# Patient Record
Sex: MALE | Race: WHITE | NOT HISPANIC OR LATINO | Employment: FULL TIME | URBAN - METROPOLITAN AREA
[De-identification: names, ages, dates, MRNs, and addresses within clinical notes are randomized per-mention and may not be internally consistent; named-entity substitution may affect disease eponyms.]

---

## 2021-02-26 ENCOUNTER — OFFICE VISIT (OUTPATIENT)
Dept: FAMILY MEDICINE CLINIC | Facility: CLINIC | Age: 61
End: 2021-02-26
Payer: COMMERCIAL

## 2021-02-26 VITALS
DIASTOLIC BLOOD PRESSURE: 82 MMHG | WEIGHT: 149.8 LBS | HEIGHT: 70 IN | BODY MASS INDEX: 21.45 KG/M2 | SYSTOLIC BLOOD PRESSURE: 122 MMHG | HEART RATE: 76 BPM

## 2021-02-26 DIAGNOSIS — N52.9 ERECTILE DYSFUNCTION, UNSPECIFIED ERECTILE DYSFUNCTION TYPE: ICD-10-CM

## 2021-02-26 DIAGNOSIS — Z86.16 HISTORY OF COVID-19: ICD-10-CM

## 2021-02-26 DIAGNOSIS — K40.90 LEFT INGUINAL HERNIA: Primary | ICD-10-CM

## 2021-02-26 DIAGNOSIS — F41.9 ANXIETY: ICD-10-CM

## 2021-02-26 PROCEDURE — 99204 OFFICE O/P NEW MOD 45 MIN: CPT | Performed by: PHYSICIAN ASSISTANT

## 2021-02-26 PROCEDURE — 3725F SCREEN DEPRESSION PERFORMED: CPT | Performed by: PHYSICIAN ASSISTANT

## 2021-02-26 RX ORDER — LORAZEPAM 0.5 MG/1
TABLET ORAL
Qty: 2 TABLET | Refills: 0 | Status: SHIPPED | OUTPATIENT
Start: 2021-02-26

## 2021-02-26 NOTE — PATIENT INSTRUCTIONS
Assessment/plan:  1  Left inguinal hernia-patient should consult with general surgeon for evaluation and consider treatment options  There is no sign of strangulation or need for emergent surgery  2  Erectile dysfunction-new onset  I do not feel it is likely related to his hernia, but more likely prostate or blood flow  He does seem to be getting some benefit since he has been using saw palmetto and he prefers not to use other medications at this point so we will continue observation and he will continue his herbal supplement  I would offer something like Cialis in the future which has dual benefit of prostate health and erectile function if necessary  3  Family history of cancer-patient does have significant family history of multiple cancers  Would recommend screening labs be performed  4  Anxiety-patient has significant fear of needles and blood work  He has always passed out in the past   I will give him for a prescription for Ativan 0 5 mg to be taken 1-1/2 hours prior to procedure  His wife will drive him in take him home from procedure and he may not drive for 8 hours after taking the medication  Patient verbalizes understanding and agreement with plan

## 2021-02-26 NOTE — PROGRESS NOTES
Assessment and Plan:  Patient Instructions   Assessment/plan:  1  Left inguinal hernia-patient should consult with general surgeon for evaluation and consider treatment options  There is no sign of strangulation or need for emergent surgery  2  Erectile dysfunction-new onset  I do not feel it is likely related to his hernia, but more likely prostate or blood flow  He does seem to be getting some benefit since he has been using saw palmetto and he prefers not to use other medications at this point so we will continue observation and he will continue his herbal supplement  I would offer something like Cialis in the future which has dual benefit of prostate health and erectile function if necessary  3  Family history of cancer-patient does have significant family history of multiple cancers  Would recommend screening labs be performed  4  Anxiety-patient has significant fear of needles and blood work  He has always passed out in the past   I will give him for a prescription for Ativan 0 5 mg to be taken 1-1/2 hours prior to procedure  His wife will drive him in take him home from procedure and he may not drive for 8 hours after taking the medication  Patient verbalizes understanding and agreement with plan          Problem List Items Addressed This Visit     None      Visit Diagnoses     Left inguinal hernia    -  Primary    Relevant Orders    Ambulatory referral to General Surgery    Erectile dysfunction, unspecified erectile dysfunction type        Relevant Orders    CBC and differential    Comprehensive metabolic panel    Lipid Panel with Direct LDL reflex    PSA, Total Screen    TSH, 3rd generation with Free T4 reflex    Anxiety        Relevant Medications    LORazepam (ATIVAN) 0 5 mg tablet    History of COVID-19        Relevant Orders    CBC and differential    Comprehensive metabolic panel    Lipid Panel with Direct LDL reflex    PSA, Total Screen    TSH, 3rd generation with Free T4 reflex Diagnoses and all orders for this visit:    Left inguinal hernia  -     Ambulatory referral to General Surgery; Future    Erectile dysfunction, unspecified erectile dysfunction type  -     CBC and differential  -     Comprehensive metabolic panel  -     Lipid Panel with Direct LDL reflex  -     PSA, Total Screen  -     TSH, 3rd generation with Free T4 reflex    Anxiety  -     LORazepam (ATIVAN) 0 5 mg tablet; Take 1 tablet about 1-1/2 hours prior to procedure  History of COVID-19  -     CBC and differential  -     Comprehensive metabolic panel  -     Lipid Panel with Direct LDL reflex  -     PSA, Total Screen  -     TSH, 3rd generation with Free T4 reflex    Other orders  -     patient supplied medication; Urinozinc/ Prostate vitamin   -     Cancel: Echo complete with contrast if indicated; Future              Subjective:      Patient ID: Annie Robles is a 61 y o  male  CC:    Chief Complaint   Patient presents with   [address] [place] [address] Patient Visit     Pt here after many years to get Routine check up  Pt also states he had confirmed COVID in 11/2020 but thinks he may have had COVID in January 2020  He has many after effects from having it   Erectile Dysfunction     Pt states since COVID he is having issues with ED   Edema     Pt has some swelling on the left side of groin and it was many years ago that this happened and resolved but over the last 4 months this has come back  kw    Fainting     Pt just wanted to let us know that he has a fear of needles, doctors, etc and has a tendency to faint during procedures  HPI:    HPI:  This is a 80-year-old gentleman that presents to the office after not being seen for about 15 years  He has been feeling well and trying to stay healthy on his own  He believes primarily in less medicine and prefers herbal remedies or natural things when possible  He did have COVID infection confirmed back in November    Since then he has had some altered sense of taste, most specifically with regard to his previous soda and caffeine addiction  He was drinking about 10 cans of Dr Kendra Suazo soda per day and coffee and he seemed to lose his taste for both  He has now been drinking more water and juice since  Otherwise he has noticed in the past 3 weeks that he had some new onset erectile difficulties  More specifically he was having difficulty achieving erection to begin with and was not having morning erections  He thought this may be related to prostate health and started taking some saw palmetto supplement over-the-counter which was of some help  He does feel that his symptoms are improving since taking it  He also had a history of a lump in the inguinal area and states it had gotten better for years but now it has returned  He is not sure if it is coincidence or if this has something to do with erectile function as well  It has been decades since he has had any testing or screening done since he does fear doctors, testing, and needles  He has always passed out with blood work in the past       The following portions of the patient's history were reviewed and updated as appropriate: allergies, current medications, past family history, past medical history, past social history, past surgical history and problem list       Review of Systems   Constitutional: Negative for chills, fatigue and fever  HENT: Negative for congestion, ear pain and sinus pressure  Eyes: Negative for visual disturbance  Respiratory: Negative for cough, chest tightness and shortness of breath  Cardiovascular: Negative for chest pain and palpitations  Gastrointestinal: Negative for diarrhea, nausea and vomiting  Endocrine: Negative for polyuria  Genitourinary: Negative for dysuria and frequency  Bulge of the left inguinal area  Musculoskeletal: Negative for arthralgias and myalgias  Skin: Negative for pallor and rash     Neurological: Negative for dizziness, weakness, light-headedness, numbness and headaches  Psychiatric/Behavioral: Negative for agitation, behavioral problems and sleep disturbance  All other systems reviewed and are negative  Data to review:       Objective:    Vitals:    02/26/21 0911   BP: 122/82   BP Location: Left arm   Patient Position: Sitting   Pulse: 76   Weight: 67 9 kg (149 lb 12 8 oz)   Height: 5' 9 5" (1 765 m)        Physical Exam  Constitutional:       General: He is not in acute distress  Appearance: He is well-developed  HENT:      Head: Normocephalic and atraumatic  Right Ear: Tympanic membrane normal       Left Ear: Tympanic membrane normal    Eyes:      Conjunctiva/sclera: Conjunctivae normal    Neck:      Musculoskeletal: Normal range of motion  Cardiovascular:      Rate and Rhythm: Normal rate and regular rhythm  Pulmonary:      Effort: Pulmonary effort is normal    Abdominal:      General: Abdomen is flat  Bowel sounds are normal  There is no distension  Palpations: Abdomen is soft  There is no mass  Genitourinary:     Comments: There does appear to be direct to left inguinal hernia present on exam   This is not reducible but not strangulated  Musculoskeletal: Normal range of motion  Skin:     General: Skin is warm  Findings: No rash  Neurological:      Mental Status: He is alert and oriented to person, place, and time  Psychiatric:         Mood and Affect: Mood normal                BMI Counseling: Body mass index is 21 8 kg/m²  The BMI is above normal  Nutrition recommendations include reducing portion sizes

## 2021-03-17 ENCOUNTER — CONSULT (OUTPATIENT)
Dept: SURGERY | Facility: CLINIC | Age: 61
End: 2021-03-17
Payer: COMMERCIAL

## 2021-03-17 VITALS
RESPIRATION RATE: 16 BRPM | SYSTOLIC BLOOD PRESSURE: 138 MMHG | DIASTOLIC BLOOD PRESSURE: 84 MMHG | HEART RATE: 73 BPM | WEIGHT: 148 LBS | TEMPERATURE: 97.4 F | BODY MASS INDEX: 21.19 KG/M2 | HEIGHT: 70 IN

## 2021-03-17 DIAGNOSIS — K40.20 NON-RECURRENT BILATERAL INGUINAL HERNIA WITHOUT OBSTRUCTION OR GANGRENE: ICD-10-CM

## 2021-03-17 DIAGNOSIS — Z12.11 ENCOUNTER FOR SCREENING COLONOSCOPY: Primary | ICD-10-CM

## 2021-03-17 PROBLEM — K40.90 LEFT INGUINAL HERNIA: Status: RESOLVED | Noted: 2021-03-17 | Resolved: 2021-03-17

## 2021-03-17 PROBLEM — K40.90 LEFT INGUINAL HERNIA: Status: ACTIVE | Noted: 2021-03-17

## 2021-03-17 PROCEDURE — 1036F TOBACCO NON-USER: CPT | Performed by: SURGERY

## 2021-03-17 PROCEDURE — 99213 OFFICE O/P EST LOW 20 MIN: CPT | Performed by: SURGERY

## 2021-03-17 PROCEDURE — 3008F BODY MASS INDEX DOCD: CPT | Performed by: SURGERY

## 2021-03-17 NOTE — ASSESSMENT & PLAN NOTE
He does have evidence of reducible bilateral inguinal hernias  They are on the small side and mostly fat containing  I discussed with him that I recommend consideration for repair and that explained I can fix them both the same time  I recommend a laparoscopic robotic approach  However this time he is very anxious about any medical procedures and is not interested in pursuing repair at this time  I went through with him the symptoms and signs of incarceration strangulation and that if things change or increase in size he should call back for evaluation

## 2021-03-17 NOTE — ASSESSMENT & PLAN NOTE
Also discussed with him the need for a colonoscopy  He has not had 1 and he is 10 years overdue  I explained to him the benefits to screening colonoscopy and procedure and what it would entail  But again he is very anxious about all medical procedures is not interested at this time  Told that if he changes mind after he discusses this with his wife he should return for evaluation

## 2021-03-17 NOTE — PROGRESS NOTES
Assessment/Plan:    Non-recurrent bilateral inguinal hernia without obstruction or gangrene    He does have evidence of reducible bilateral inguinal hernias  They are on the small side and mostly fat containing  I discussed with him that I recommend consideration for repair and that explained I can fix them both the same time  I recommend a laparoscopic robotic approach  However this time he is very anxious about any medical procedures and is not interested in pursuing repair at this time  I went through with him the symptoms and signs of incarceration strangulation and that if things change or increase in size he should call back for evaluation  Encounter for screening colonoscopy    Also discussed with him the need for a colonoscopy  He has not had 1 and he is 10 years overdue  I explained to him the benefits to screening colonoscopy and procedure and what it would entail  But again he is very anxious about all medical procedures is not interested at this time  Told that if he changes mind after he discusses this with his wife he should return for evaluation  Diagnoses and all orders for this visit:    Encounter for screening colonoscopy    Non-recurrent bilateral inguinal hernia without obstruction or gangrene  -     Ambulatory referral to General Surgery          Subjective:      Patient ID: Gila Shah is a 61 y o  male  MrStew  Is a 79-year-old gentleman is here for evaluation of a possible left inguinal hernia  Patient states he believes around 15 years ago he had a lump in that area that was maybe hernia  He felt that got better and has not noticed it for a long time  Over last few months we started noticing increasing swelling left side again  He was COVID positive in November and maybe the coughing increased size  He does notice a lump in the left side  He denies pain  Denies nausea vomiting  in addition he has never had a colonoscopy    He states his bowel have my of change recently  He denies any blood in the stool or any unexplained weight loss  He is very anxious about all medical procedures and is very hesitant to follow-up with physicians  The following portions of the patient's history were reviewed and updated as appropriate: allergies, current medications, past family history, past medical history, past social history, past surgical history and problem list     Review of Systems   Constitutional: Negative for chills and fever  HENT: Negative for ear pain and sore throat  Eyes: Negative for pain and visual disturbance  Respiratory: Negative for cough and shortness of breath  Cardiovascular: Negative for chest pain and palpitations  Gastrointestinal: Negative for abdominal pain and vomiting  Musculoskeletal: Negative for arthralgias and back pain  Skin: Negative for color change and rash  Neurological: Negative for seizures and syncope  Psychiatric/Behavioral: Negative for agitation  The patient is nervous/anxious  All other systems reviewed and are negative  Objective:      /84   Pulse 73   Temp (!) 97 4 °F (36 3 °C)   Resp 16   Ht 5' 9 5" (1 765 m)   Wt 67 1 kg (148 lb)   BMI 21 54 kg/m²          Physical Exam  Vitals signs and nursing note reviewed  Exam conducted with a chaperone present  Constitutional:       Appearance: Normal appearance  HENT:      Head: Normocephalic and atraumatic  Cardiovascular:      Rate and Rhythm: Normal rate and regular rhythm  Pulmonary:      Effort: Pulmonary effort is normal       Breath sounds: Normal breath sounds  Abdominal:      General: There is no distension  Palpations: Abdomen is soft  There is no mass  Tenderness: There is no abdominal tenderness  Comments:   Bilateral reducible inguinal hernias left greater than right  Musculoskeletal: Normal range of motion  Skin:     General: Skin is warm and dry  Neurological:      Mental Status: He is alert  Psychiatric:         Mood and Affect: Mood normal          Behavior: Behavior normal

## 2024-02-27 ENCOUNTER — OFFICE VISIT (OUTPATIENT)
Dept: SURGERY | Facility: CLINIC | Age: 64
End: 2024-02-27
Payer: COMMERCIAL

## 2024-02-27 VITALS
HEIGHT: 71 IN | BODY MASS INDEX: 21.14 KG/M2 | RESPIRATION RATE: 18 BRPM | WEIGHT: 151 LBS | OXYGEN SATURATION: 97 % | DIASTOLIC BLOOD PRESSURE: 80 MMHG | HEART RATE: 71 BPM | TEMPERATURE: 98 F | SYSTOLIC BLOOD PRESSURE: 131 MMHG

## 2024-02-27 DIAGNOSIS — K40.20 NON-RECURRENT BILATERAL INGUINAL HERNIA WITHOUT OBSTRUCTION OR GANGRENE: Primary | ICD-10-CM

## 2024-02-27 DIAGNOSIS — N40.0 PROSTATE ENLARGEMENT: ICD-10-CM

## 2024-02-27 DIAGNOSIS — Z12.11 ENCOUNTER FOR SCREENING COLONOSCOPY: ICD-10-CM

## 2024-02-27 PROCEDURE — 99203 OFFICE O/P NEW LOW 30 MIN: CPT | Performed by: SURGERY

## 2024-02-27 RX ORDER — SODIUM CHLORIDE, SODIUM LACTATE, POTASSIUM CHLORIDE, CALCIUM CHLORIDE 600; 310; 30; 20 MG/100ML; MG/100ML; MG/100ML; MG/100ML
125 INJECTION, SOLUTION INTRAVENOUS CONTINUOUS
OUTPATIENT
Start: 2024-04-25

## 2024-02-27 NOTE — ASSESSMENT & PLAN NOTE
Again reviewed the need for a screening colonoscopy.  He is more open to follow-up with this would like to have his hernias fixed and his evaluation by urology first.

## 2024-02-27 NOTE — ASSESSMENT & PLAN NOTE
He has bilateral inguinal hernias on exam.  Will plan for robotic repair of his bilateral inguinal hernias with mesh at [facility].  The risks benefits alternatives explained to him is agreeable to proceed.

## 2024-02-27 NOTE — ASSESSMENT & PLAN NOTE
He is complaining of some urinary issues and also some intermittent left testicular pain.  I will refer him to urology per patient request for evaluation.

## 2024-02-27 NOTE — PROGRESS NOTES
Assessment/Plan:    Non-recurrent bilateral inguinal hernia without obstruction or gangrene  He has bilateral inguinal hernias on exam.  Will plan for robotic repair of his bilateral inguinal hernias with mesh at [facility].  The risks benefits alternatives explained to him is agreeable to proceed.    Encounter for screening colonoscopy  Again reviewed the need for a screening colonoscopy.  He is more open to follow-up with this would like to have his hernias fixed and his evaluation by urology first.    Prostate enlargement  He is complaining of some urinary issues and also some intermittent left testicular pain.  I will refer him to urology per patient request for evaluation.       Diagnoses and all orders for this visit:    Non-recurrent bilateral inguinal hernia without obstruction or gangrene  -     Case request operating room: REPAIR HERNIA INGUINAL LAPAROSCOPIC W/ ROBOTICS; Standing  -     EKG 12 lead; Future  -     Case request operating room: REPAIR HERNIA INGUINAL LAPAROSCOPIC W/ ROBOTICS    Prostate enlargement  -     Ambulatory Referral to Urology; Future    Encounter for screening colonoscopy    Other orders  -     Diet NPO; Sips with meds; Standing  -     Apply Sequential Compression Device; Standing  -     Place sequential compression device; Standing          Subjective:      Patient ID: Jere Beckman is a 63 y.o. male.    Mr Beckman is a 63 old gentleman here for evaluation of inguinal hernias.  He was seen about 3 years ago and has been doing well since then.  However in February with the snow fall he hurt his back with shoveling and since then he is noticed more awareness of his left groin than previous.  He denies nausea vomiting fevers or chills.  He is also been having some urinary symptoms of weaker flow and nocturia.        The following portions of the patient's history were reviewed and updated as appropriate: allergies, current medications, past family history, past medical  "history, past social history, past surgical history, and problem list.    Review of Systems   Constitutional:  Negative for chills and fever.   HENT:  Negative for ear pain and sore throat.    Eyes:  Negative for pain and visual disturbance.   Respiratory:  Negative for cough and shortness of breath.    Cardiovascular:  Negative for chest pain and palpitations.   Gastrointestinal:  Negative for abdominal pain and vomiting.   Genitourinary:  Positive for frequency.   Musculoskeletal:  Negative for arthralgias and back pain.   Skin:  Negative for color change and rash.   Neurological:  Negative for seizures and syncope.   All other systems reviewed and are negative.        Objective:      /80 (BP Location: Left arm, Patient Position: Sitting, Cuff Size: Large)   Pulse 71   Temp 98 °F (36.7 °C) (Temporal)   Resp 18   Ht 5' 11\" (1.803 m)   Wt 68.5 kg (151 lb)   SpO2 97%   BMI 21.06 kg/m²          Physical Exam  Vitals and nursing note reviewed.   Constitutional:       Appearance: Normal appearance.   Cardiovascular:      Rate and Rhythm: Normal rate and regular rhythm.   Pulmonary:      Effort: Pulmonary effort is normal.      Breath sounds: Normal breath sounds.   Abdominal:      General: There is no distension.      Palpations: Abdomen is soft. There is no mass.      Tenderness: There is no abdominal tenderness.      Comments: Reducible bilateral inguinal hernia   Neurological:      Mental Status: He is alert.   Psychiatric:         Mood and Affect: Mood normal.         Behavior: Behavior normal.           "

## 2024-04-05 ENCOUNTER — OFFICE VISIT (OUTPATIENT)
Dept: UROLOGY | Facility: CLINIC | Age: 64
End: 2024-04-05
Payer: COMMERCIAL

## 2024-04-05 VITALS
HEIGHT: 71 IN | DIASTOLIC BLOOD PRESSURE: 88 MMHG | HEART RATE: 95 BPM | BODY MASS INDEX: 21.14 KG/M2 | OXYGEN SATURATION: 97 % | SYSTOLIC BLOOD PRESSURE: 142 MMHG | WEIGHT: 151 LBS

## 2024-04-05 DIAGNOSIS — N52.9 ERECTILE DYSFUNCTION, UNSPECIFIED ERECTILE DYSFUNCTION TYPE: ICD-10-CM

## 2024-04-05 DIAGNOSIS — N40.0 PROSTATE ENLARGEMENT: Primary | ICD-10-CM

## 2024-04-05 LAB — POST-VOID RESIDUAL VOLUME, ML POC: 31 ML

## 2024-04-05 PROCEDURE — 51798 US URINE CAPACITY MEASURE: CPT | Performed by: UROLOGY

## 2024-04-05 PROCEDURE — 99204 OFFICE O/P NEW MOD 45 MIN: CPT | Performed by: UROLOGY

## 2024-04-05 NOTE — PROGRESS NOTES
ASSESSMENT:     63 y.o. male  with significant anxiety, left testicular pain, mild BPH symptoms, need for updated rectal exam    PLAN:     Patient is doing well from a urinary standpoint on his supplement which contains saw palmetto.  He is not interested in additional medical therapy.  He has some mild erectile dysfunction but would not be inclined to PDE 5 inhibitors.  We did discuss the use of Cialis 5 mg daily to assist both of his issues.    Patient needs an updated PSA but given his significant anxiety and difficulty with blood draws, he is waiting to obtain blood work until he makes a decision of surgery.  He was able to undertake a rectal exam today in the decubitus position.    I have reassured the patient about his left-sided groin and testicular discomfort.  This could be related to local compression from his prominent hernia.  I would not recommend any intervention from a urologic standpoint and have cleared him to proceed with robotic hernia repair.        ----          UROLOGY NEW CONSULT NOTE     CHIEF COMPLAINT   Jere Beckman is a 63 y.o. male with a complaint of   Chief Complaint   Patient presents with    New Patient Visit     Enlarged prostate/ ED       History of Present Illness:   Jere Beckman is a 63 y.o. male here for evaluation of left testicular pain.  Patient has significant bilateral hernias, more prominent on the left than right.  He has seen general surgery and is potentially planning a robotic hernia repair.  The discomfort in his groin is intermittent and vague.    Patient has had some urinary issues that were mild and bothersome about a year ago.  He began to utilize an over-the-counter supplement that contains saw palmetto which has dramatically improved his symptomatology.  He also notes some mild erectile dysfunction but is not interested in treatment.    Patient has significant anxiety and has avoided doctors for most of his life.  He gets vasovagal and often passes out with  "even discussions of medical procedures or interventions.    No results found for: \"PSA\"    Past Medical History:     Past Medical History:   Diagnosis Date    Anxiety     COVID-19 11/21/2020    ED (erectile dysfunction)     Inguinal hernia, left        PAST SURGICAL HISTORY:   History reviewed. No pertinent surgical history.    CURRENT MEDICATIONS:     Current Outpatient Medications   Medication Sig Dispense Refill    patient supplied medication Urinozinc/ Prostate vitamin.      LORazepam (ATIVAN) 0.5 mg tablet Take 1 tablet about 1-1/2 hours prior to procedure. (Patient not taking: Reported on 2/27/2024) 2 tablet 0     No current facility-administered medications for this visit.       ALLERGIES:   No Known Allergies    SOCIAL HISTORY:     Social History     Socioeconomic History    Marital status: /Civil Union     Spouse name: None    Number of children: None    Years of education: None    Highest education level: None   Occupational History    None   Tobacco Use    Smoking status: Some Days     Types: Cigars    Smokeless tobacco: Never   Vaping Use    Vaping status: Never Used   Substance and Sexual Activity    Alcohol use: Yes     Alcohol/week: 1.0 standard drink of alcohol     Types: 1 Glasses of wine per week     Comment: social    Drug use: Never    Sexual activity: None   Other Topics Concern    None   Social History Narrative    None     Social Determinants of Health     Financial Resource Strain: Not on file   Food Insecurity: Not on file   Transportation Needs: Not on file   Physical Activity: Not on file   Stress: Not on file   Social Connections: Not on file   Intimate Partner Violence: Not on file   Housing Stability: Not on file       SOCIAL HISTORY:     Family History   Problem Relation Age of Onset    Ovarian cancer Mother     Lung cancer Mother     Thyroid cancer Mother        REVIEW OF SYSTEMS:     Review of Systems   Constitutional:  Negative for chills and fever.   HENT:  Negative for ear " "pain and sore throat.    Eyes:  Negative for pain and visual disturbance.   Respiratory:  Negative for cough and shortness of breath.    Cardiovascular:  Negative for chest pain and palpitations.   Gastrointestinal:  Negative for abdominal pain and vomiting.   Genitourinary:  Positive for testicular pain. Negative for dysuria, frequency, hematuria and urgency.   Musculoskeletal:  Negative for arthralgias and back pain.   Skin:  Negative for color change and rash.   Neurological:  Negative for seizures and syncope.   Psychiatric/Behavioral:  The patient is nervous/anxious.    All other systems reviewed and are negative.        PHYSICAL EXAM:     /88 (BP Location: Left arm, Patient Position: Sitting, Cuff Size: Adult)   Pulse 95   Ht 5' 11\" (1.803 m)   Wt 68.5 kg (151 lb)   SpO2 97%   BMI 21.06 kg/m²     Physical Exam  Vitals reviewed.   Constitutional:       General: He is not in acute distress.     Appearance: He is well-developed.   HENT:      Head: Normocephalic and atraumatic.   Eyes:      Pupils: Pupils are equal, round, and reactive to light.   Cardiovascular:      Rate and Rhythm: Normal rate.   Pulmonary:      Effort: Pulmonary effort is normal. No respiratory distress.      Breath sounds: Normal breath sounds.   Abdominal:      General: There is no distension.      Palpations: Abdomen is soft.      Tenderness: There is no abdominal tenderness.   Genitourinary:     Penis: Normal.       Testes: Normal.      Prostate: Normal.      Comments: Angiokeratoma's of the bilateral scrotal skin, reducible foreskin, testicles are smooth without nodules, prominent left hernia is noted and soft reducible, patient was able to tolerate rectal exam which was smooth and without tumor  Musculoskeletal:         General: Normal range of motion.      Cervical back: Normal range of motion and neck supple.   Skin:     General: Skin is warm and dry.   Neurological:      Mental Status: He is alert and oriented to person, " "place, and time.   Psychiatric:         Behavior: Behavior normal.         LABS:     CBC: No results found for: \"WBC\", \"HGB\", \"HCT\", \"MCV\", \"PLT\"    BMP: No results found for: \"GLUCOSE\", \"CALCIUM\", \"NA\", \"K\", \"CO2\", \"CL\", \"BUN\", \"CREATININE\"    PROCEDURE:     Recent Results (from the past 2 hour(s))   POCT Measure PVR    Collection Time: 04/05/24  3:27 PM   Result Value Ref Range    POST-VOID RESIDUAL VOLUME, ML POC 31 mL   ]    "

## 2024-04-09 ENCOUNTER — APPOINTMENT (OUTPATIENT)
Dept: LAB | Facility: HOSPITAL | Age: 64
End: 2024-04-09

## 2024-04-09 DIAGNOSIS — K40.20 NON-RECURRENT BILATERAL INGUINAL HERNIA WITHOUT OBSTRUCTION OR GANGRENE: ICD-10-CM

## 2024-04-12 LAB
ATRIAL RATE: 65 BPM
P AXIS: 58 DEGREES
PR INTERVAL: 160 MS
QRS AXIS: 7 DEGREES
QRSD INTERVAL: 90 MS
QT INTERVAL: 402 MS
QTC INTERVAL: 418 MS
T WAVE AXIS: 60 DEGREES
VENTRICULAR RATE: 65 BPM

## 2024-04-16 NOTE — PRE-PROCEDURE INSTRUCTIONS
Pre-Surgery Instructions:   Medication Instructions    patient supplied medication Stop taking 7 days prior to surgery.    Medication instructions for day surgery reviewed. Please use only a sip of water to take your instructed medications. Avoid all over the counter vitamins, supplements and NSAIDS for one week prior to surgery per anesthesia guidelines. Tylenol is ok to take as needed.     You will receive a call one business day prior to surgery with an arrival time and hospital directions. If your surgery is scheduled on a Monday, the hospital will be calling you on the Friday prior to your surgery. If you have not heard from anyone by 8pm, please call the hospital supervisor through the hospital  at [phone]. ([place] [phone] or [place] [phone]).    Do not eat or drink anything after midnight the night before your surgery, including candy, mints, lifesavers, or chewing gum. Do not drink alcohol 24hrs before your surgery. Try not to smoke at least 24hrs before your surgery.       Follow the pre surgery showering instructions as listed in the “My Surgical Experience Booklet” or otherwise provided by your surgeon's office. Do not use a blade to shave the surgical area 1 week before surgery. It is okay to use a clean electric clippers up to 24 hours before surgery. Do not apply any lotions, creams, including makeup, cologne, deodorant, or perfumes after showering on the day of your surgery. Do not use dry shampoo, hair spray, hair gel, or any type of hair products.     No contact lenses, eye make-up, or artificial eyelashes. Remove nail polish, including gel polish, and any artificial, gel, or acrylic nails if possible. Remove all jewelry including rings and body piercing jewelry.     Wear causal clothing that is easy to take on and off. Consider your type of surgery.    Keep any valuables, jewelry, piercings at home. Please bring any specially ordered equipment (sling, braces) if  indicated.    Arrange for a responsible person to drive you to and from the hospital on the day of your surgery. Please confirm the visitor policy for the day of your procedure when you receive your phone call with an arrival time.     Call the surgeon's office with any new illnesses, exposures, or additional questions prior to surgery.    Please reference your “My Surgical Experience Booklet” for additional information to prepare for your upcoming surgery.

## 2024-04-23 ENCOUNTER — ANESTHESIA EVENT (OUTPATIENT)
Dept: PERIOP | Facility: HOSPITAL | Age: 64
End: 2024-04-23
Payer: COMMERCIAL

## 2024-04-25 ENCOUNTER — ANESTHESIA (OUTPATIENT)
Dept: PERIOP | Facility: HOSPITAL | Age: 64
End: 2024-04-25
Payer: COMMERCIAL

## 2024-07-12 RX ORDER — MULTIVITAMIN
1 TABLET ORAL DAILY
COMMUNITY

## 2024-07-12 NOTE — PRE-PROCEDURE INSTRUCTIONS
Pre-Surgery Instructions:   Medication Instructions    Multiple Vitamin (multivitamin) tablet Stop taking 7 days prior to surgery.    patient supplied medication Stop taking 7 days prior to surgery.    Medication instructions for day surgery reviewed. Please use only a sip of water to take your instructed medications. Avoid all over the counter vitamins, supplements and NSAIDS for one week prior to surgery per anesthesia guidelines. Tylenol is ok to take as needed.     You will receive a call one business day prior to surgery with an arrival time and hospital directions. If your surgery is scheduled on a Monday, the hospital will be calling you on the Friday prior to your surgery. If you have not heard from anyone by 8pm, please call the hospital supervisor through the hospital  at [phone]. ([place] [phone] or [place] [phone]).    Do not eat or drink anything after midnight the night before your surgery, including candy, mints, lifesavers, or chewing gum. Do not drink alcohol 24hrs before your surgery. Try not to smoke at least 24hrs before your surgery.       Follow the pre surgery showering instructions as listed in the “My Surgical Experience Booklet” or otherwise provided by your surgeon's office. Do not use a blade to shave the surgical area 1 week before surgery. It is okay to use a clean electric clippers up to 24 hours before surgery. Do not apply any lotions, creams, including makeup, cologne, deodorant, or perfumes after showering on the day of your surgery. Do not use dry shampoo, hair spray, hair gel, or any type of hair products.     No contact lenses, eye make-up, or artificial eyelashes. Remove nail polish, including gel polish, and any artificial, gel, or acrylic nails if possible. Remove all jewelry including rings and body piercing jewelry.     Wear causal clothing that is easy to take on and off. Consider your type of surgery.    Keep any valuables, jewelry, piercings at  home. Please bring any specially ordered equipment (sling, braces) if indicated.    Arrange for a responsible person to drive you to and from the hospital on the day of your surgery. Please confirm the visitor policy for the day of your procedure when you receive your phone call with an arrival time.     Call the surgeon's office with any new illnesses, exposures, or additional questions prior to surgery.    Please reference your “My Surgical Experience Booklet” for additional information to prepare for your upcoming surgery.    Pt has surgical soap.

## 2024-07-17 PROBLEM — F17.200 SMOKING: Status: ACTIVE | Noted: 2024-07-17

## 2024-07-17 PROBLEM — Z98.890 PONV (POSTOPERATIVE NAUSEA AND VOMITING): Status: ACTIVE | Noted: 2024-07-17

## 2024-07-17 PROBLEM — IMO0001 SMOKING: Status: ACTIVE | Noted: 2024-07-17

## 2024-07-17 PROBLEM — R11.2 PONV (POSTOPERATIVE NAUSEA AND VOMITING): Status: ACTIVE | Noted: 2024-07-17

## 2024-07-17 NOTE — ANESTHESIA PREPROCEDURE EVALUATION
Procedure:  REPAIR HERNIA INGUINAL  W/ ROBOTICS (Bilateral: Groin)    Relevant Problems   ANESTHESIA   (+) PONV (postoperative nausea and vomiting)      CARDIO (within normal limits)      PULMONARY   (+) Smoking        Physical Exam    Airway    Mallampati score: II  TM Distance: >3 FB  Neck ROM: full     Dental   No notable dental hx     Cardiovascular  Rhythm: regular, Rate: normal, Cardiovascular exam normal    Pulmonary  Pulmonary exam normal Breath sounds clear to auscultation    Other Findings        Anesthesia Plan  ASA Score- 2     Anesthesia Type- general with ASA Monitors.         Additional Monitors:     Airway Plan: ETT.           Plan Factors-Exercise tolerance (METS): >4 METS.    Chart reviewed. EKG reviewed.   Patient summary reviewed.    Patient is a current smoker.  Patient instructed to abstain from smoking on day of procedure. Patient did not smoke on day of surgery.            Induction- intravenous.    Postoperative Plan-         Informed Consent- Anesthetic plan and risks discussed with patient.

## 2024-07-18 ENCOUNTER — HOSPITAL ENCOUNTER (OUTPATIENT)
Facility: HOSPITAL | Age: 64
Setting detail: OUTPATIENT SURGERY
Discharge: HOME | End: 2024-07-18
Attending: SURGERY | Admitting: SURGERY
Payer: COMMERCIAL

## 2024-07-18 VITALS
WEIGHT: 151.24 LBS | BODY MASS INDEX: 21.17 KG/M2 | TEMPERATURE: 97.1 F | HEIGHT: 71 IN | DIASTOLIC BLOOD PRESSURE: 56 MMHG | HEART RATE: 70 BPM | RESPIRATION RATE: 16 BRPM | OXYGEN SATURATION: 95 % | SYSTOLIC BLOOD PRESSURE: 121 MMHG

## 2024-07-18 DIAGNOSIS — K40.20 NON-RECURRENT BILATERAL INGUINAL HERNIA WITHOUT OBSTRUCTION OR GANGRENE: Primary | ICD-10-CM

## 2024-07-18 PROCEDURE — 49650 LAP ING HERNIA REPAIR INIT: CPT | Performed by: PHYSICIAN ASSISTANT

## 2024-07-18 PROCEDURE — S2900 ROBOTIC SURGICAL SYSTEM: HCPCS | Performed by: SURGERY

## 2024-07-18 PROCEDURE — NC001 PR NO CHARGE: Performed by: SURGERY

## 2024-07-18 PROCEDURE — C1781 MESH (IMPLANTABLE): HCPCS | Performed by: SURGERY

## 2024-07-18 PROCEDURE — 49650 LAP ING HERNIA REPAIR INIT: CPT | Performed by: SURGERY

## 2024-07-18 DEVICE — 3DMAX™ MID ANATOMICAL MESH, XL, LEFT, 5” X 7”, 12 X 17 CM
Type: IMPLANTABLE DEVICE | Site: INGUINAL | Status: FUNCTIONAL
Brand: 3DMAX™ MID ANATOMICAL MESH

## 2024-07-18 DEVICE — 3DMAX™ MID ANATOMICAL MESH, XL, RIGHT, 5” X 7”, 12 X 17 CM
Type: IMPLANTABLE DEVICE | Site: INGUINAL | Status: FUNCTIONAL
Brand: 3DMAX™ MID ANATOMICAL MESH

## 2024-07-18 RX ORDER — ONDANSETRON 2 MG/ML
4 INJECTION INTRAMUSCULAR; INTRAVENOUS ONCE AS NEEDED
Status: DISCONTINUED | OUTPATIENT
Start: 2024-07-18 | End: 2024-07-18 | Stop reason: HOSPADM

## 2024-07-18 RX ORDER — CEFAZOLIN SODIUM 2 G/50ML
2000 SOLUTION INTRAVENOUS ONCE
Status: DISCONTINUED | OUTPATIENT
Start: 2024-07-18 | End: 2024-07-18 | Stop reason: HOSPADM

## 2024-07-18 RX ORDER — MEPERIDINE HYDROCHLORIDE 25 MG/ML
12.5 INJECTION INTRAMUSCULAR; INTRAVENOUS; SUBCUTANEOUS
Status: DISCONTINUED | OUTPATIENT
Start: 2024-07-18 | End: 2024-07-18 | Stop reason: HOSPADM

## 2024-07-18 RX ORDER — SUCCINYLCHOLINE/SOD CL,ISO/PF 100 MG/5ML
SYRINGE (ML) INTRAVENOUS AS NEEDED
Status: DISCONTINUED | OUTPATIENT
Start: 2024-07-18 | End: 2024-07-18

## 2024-07-18 RX ORDER — DEXAMETHASONE SODIUM PHOSPHATE 10 MG/ML
INJECTION, SOLUTION INTRAMUSCULAR; INTRAVENOUS AS NEEDED
Status: DISCONTINUED | OUTPATIENT
Start: 2024-07-18 | End: 2024-07-18

## 2024-07-18 RX ORDER — HYDROMORPHONE HCL/PF 1 MG/ML
0.5 SYRINGE (ML) INJECTION
Status: DISCONTINUED | OUTPATIENT
Start: 2024-07-18 | End: 2024-07-18 | Stop reason: HOSPADM

## 2024-07-18 RX ORDER — ONDANSETRON 2 MG/ML
INJECTION INTRAMUSCULAR; INTRAVENOUS AS NEEDED
Status: DISCONTINUED | OUTPATIENT
Start: 2024-07-18 | End: 2024-07-18

## 2024-07-18 RX ORDER — ACETAMINOPHEN 325 MG/1
650 TABLET ORAL EVERY 6 HOURS PRN
Status: DISCONTINUED | OUTPATIENT
Start: 2024-07-18 | End: 2024-07-18

## 2024-07-18 RX ORDER — OXYCODONE HYDROCHLORIDE 5 MG/1
5 TABLET ORAL EVERY 4 HOURS PRN
Status: DISCONTINUED | OUTPATIENT
Start: 2024-07-18 | End: 2024-07-18 | Stop reason: HOSPADM

## 2024-07-18 RX ORDER — SODIUM CHLORIDE, SODIUM LACTATE, POTASSIUM CHLORIDE, CALCIUM CHLORIDE 600; 310; 30; 20 MG/100ML; MG/100ML; MG/100ML; MG/100ML
125 INJECTION, SOLUTION INTRAVENOUS CONTINUOUS
Status: DISCONTINUED | OUTPATIENT
Start: 2024-07-18 | End: 2024-07-18 | Stop reason: HOSPADM

## 2024-07-18 RX ORDER — BUPIVACAINE HYDROCHLORIDE AND EPINEPHRINE 2.5; 5 MG/ML; UG/ML
INJECTION, SOLUTION EPIDURAL; INFILTRATION; INTRACAUDAL; PERINEURAL AS NEEDED
Status: DISCONTINUED | OUTPATIENT
Start: 2024-07-18 | End: 2024-07-18 | Stop reason: HOSPADM

## 2024-07-18 RX ORDER — FENTANYL CITRATE/PF 50 MCG/ML
25 SYRINGE (ML) INJECTION
Status: DISCONTINUED | OUTPATIENT
Start: 2024-07-18 | End: 2024-07-18 | Stop reason: HOSPADM

## 2024-07-18 RX ORDER — SODIUM CHLORIDE 9 MG/ML
125 INJECTION, SOLUTION INTRAVENOUS CONTINUOUS
Status: DISCONTINUED | OUTPATIENT
Start: 2024-07-18 | End: 2024-07-18 | Stop reason: HOSPADM

## 2024-07-18 RX ORDER — ACETAMINOPHEN 325 MG/1
650 TABLET ORAL EVERY 6 HOURS PRN
Status: DISCONTINUED | OUTPATIENT
Start: 2024-07-18 | End: 2024-07-18 | Stop reason: HOSPADM

## 2024-07-18 RX ORDER — OXYCODONE HYDROCHLORIDE 5 MG/1
5 TABLET ORAL EVERY 4 HOURS PRN
Qty: 12 TABLET | Refills: 0 | Status: SHIPPED | OUTPATIENT
Start: 2024-07-18 | End: 2024-07-28

## 2024-07-18 RX ORDER — FENTANYL CITRATE 50 UG/ML
INJECTION, SOLUTION INTRAMUSCULAR; INTRAVENOUS AS NEEDED
Status: DISCONTINUED | OUTPATIENT
Start: 2024-07-18 | End: 2024-07-18

## 2024-07-18 RX ORDER — OXYCODONE HYDROCHLORIDE 5 MG/1
5 TABLET ORAL EVERY 4 HOURS PRN
Status: DISCONTINUED | OUTPATIENT
Start: 2024-07-18 | End: 2024-07-18

## 2024-07-18 RX ORDER — ROCURONIUM BROMIDE 10 MG/ML
INJECTION, SOLUTION INTRAVENOUS AS NEEDED
Status: DISCONTINUED | OUTPATIENT
Start: 2024-07-18 | End: 2024-07-18

## 2024-07-18 RX ORDER — PROPOFOL 10 MG/ML
INJECTION, EMULSION INTRAVENOUS AS NEEDED
Status: DISCONTINUED | OUTPATIENT
Start: 2024-07-18 | End: 2024-07-18

## 2024-07-18 RX ORDER — LORAZEPAM 2 MG/ML
0.5 INJECTION INTRAMUSCULAR ONCE AS NEEDED
Status: DISCONTINUED | OUTPATIENT
Start: 2024-07-18 | End: 2024-07-18 | Stop reason: HOSPADM

## 2024-07-18 RX ORDER — HYDROMORPHONE HCL/PF 1 MG/ML
SYRINGE (ML) INJECTION AS NEEDED
Status: DISCONTINUED | OUTPATIENT
Start: 2024-07-18 | End: 2024-07-18

## 2024-07-18 RX ORDER — MAGNESIUM HYDROXIDE 1200 MG/15ML
LIQUID ORAL AS NEEDED
Status: DISCONTINUED | OUTPATIENT
Start: 2024-07-18 | End: 2024-07-18 | Stop reason: HOSPADM

## 2024-07-18 RX ORDER — MIDAZOLAM HYDROCHLORIDE 2 MG/2ML
INJECTION, SOLUTION INTRAMUSCULAR; INTRAVENOUS AS NEEDED
Status: DISCONTINUED | OUTPATIENT
Start: 2024-07-18 | End: 2024-07-18

## 2024-07-18 RX ORDER — LIDOCAINE HYDROCHLORIDE 10 MG/ML
INJECTION, SOLUTION EPIDURAL; INFILTRATION; INTRACAUDAL; PERINEURAL AS NEEDED
Status: DISCONTINUED | OUTPATIENT
Start: 2024-07-18 | End: 2024-07-18

## 2024-07-18 RX ORDER — CEFAZOLIN SODIUM 2 G/50ML
SOLUTION INTRAVENOUS AS NEEDED
Status: DISCONTINUED | OUTPATIENT
Start: 2024-07-18 | End: 2024-07-18

## 2024-07-18 RX ORDER — PROMETHAZINE HYDROCHLORIDE 25 MG/ML
6.25 INJECTION, SOLUTION INTRAMUSCULAR; INTRAVENOUS ONCE AS NEEDED
Status: DISCONTINUED | OUTPATIENT
Start: 2024-07-18 | End: 2024-07-18 | Stop reason: HOSPADM

## 2024-07-18 RX ADMIN — PROPOFOL 200 MG: 10 INJECTION, EMULSION INTRAVENOUS at 07:31

## 2024-07-18 RX ADMIN — HYDROMORPHONE HYDROCHLORIDE 0.5 MG: 1 INJECTION, SOLUTION INTRAMUSCULAR; INTRAVENOUS; SUBCUTANEOUS at 08:15

## 2024-07-18 RX ADMIN — SODIUM CHLORIDE, SODIUM LACTATE, POTASSIUM CHLORIDE, AND CALCIUM CHLORIDE: .6; .31; .03; .02 INJECTION, SOLUTION INTRAVENOUS at 07:27

## 2024-07-18 RX ADMIN — HYDROMORPHONE HYDROCHLORIDE 0.5 MG: 1 INJECTION, SOLUTION INTRAMUSCULAR; INTRAVENOUS; SUBCUTANEOUS at 09:20

## 2024-07-18 RX ADMIN — HYDROMORPHONE HYDROCHLORIDE 0.5 MG: 1 INJECTION, SOLUTION INTRAMUSCULAR; INTRAVENOUS; SUBCUTANEOUS at 07:49

## 2024-07-18 RX ADMIN — LIDOCAINE HYDROCHLORIDE 5 ML: 10 INJECTION, SOLUTION EPIDURAL; INFILTRATION; INTRACAUDAL; PERINEURAL at 07:30

## 2024-07-18 RX ADMIN — SUGAMMADEX 200 MG: 100 INJECTION, SOLUTION INTRAVENOUS at 09:34

## 2024-07-18 RX ADMIN — CEFAZOLIN SODIUM 2000 MG: 2 SOLUTION INTRAVENOUS at 07:45

## 2024-07-18 RX ADMIN — FENTANYL CITRATE 100 MCG: 50 INJECTION INTRAMUSCULAR; INTRAVENOUS at 07:30

## 2024-07-18 RX ADMIN — ROCURONIUM BROMIDE 30 MG: 50 INJECTION, SOLUTION INTRAVENOUS at 07:40

## 2024-07-18 RX ADMIN — SODIUM CHLORIDE, SODIUM LACTATE, POTASSIUM CHLORIDE, AND CALCIUM CHLORIDE 125 ML/HR: .6; .31; .03; .02 INJECTION, SOLUTION INTRAVENOUS at 06:32

## 2024-07-18 RX ADMIN — ROCURONIUM BROMIDE 10 MG: 50 INJECTION, SOLUTION INTRAVENOUS at 09:00

## 2024-07-18 RX ADMIN — Medication 100 MG: at 07:32

## 2024-07-18 RX ADMIN — PHENYLEPHRINE HYDROCHLORIDE 30 MCG/MIN: 10 INJECTION INTRAVENOUS at 07:52

## 2024-07-18 RX ADMIN — MIDAZOLAM 2 MG: 1 INJECTION INTRAMUSCULAR; INTRAVENOUS at 07:30

## 2024-07-18 RX ADMIN — ROCURONIUM BROMIDE 10 MG: 50 INJECTION, SOLUTION INTRAVENOUS at 09:12

## 2024-07-18 RX ADMIN — TRIMETHOBENZAMIDE HYDROCHLORIDE 200 MG: 100 INJECTION INTRAMUSCULAR at 13:05

## 2024-07-18 RX ADMIN — SODIUM CHLORIDE, SODIUM LACTATE, POTASSIUM CHLORIDE, AND CALCIUM CHLORIDE: .6; .31; .03; .02 INJECTION, SOLUTION INTRAVENOUS at 08:05

## 2024-07-18 RX ADMIN — SODIUM CHLORIDE: 0.9 INJECTION, SOLUTION INTRAVENOUS at 07:35

## 2024-07-18 RX ADMIN — ROCURONIUM BROMIDE 10 MG: 50 INJECTION, SOLUTION INTRAVENOUS at 07:31

## 2024-07-18 RX ADMIN — ROCURONIUM BROMIDE 10 MG: 50 INJECTION, SOLUTION INTRAVENOUS at 08:10

## 2024-07-18 RX ADMIN — DEXAMETHASONE SODIUM PHOSPHATE 10 MG: 10 INJECTION INTRAMUSCULAR; INTRAVENOUS at 07:42

## 2024-07-18 RX ADMIN — ONDANSETRON 4 MG: 2 INJECTION INTRAMUSCULAR; INTRAVENOUS at 07:39

## 2024-07-18 RX ADMIN — MIDAZOLAM 2 MG: 1 INJECTION INTRAMUSCULAR; INTRAVENOUS at 07:26

## 2024-07-18 NOTE — H&P
History & Physical    Jere Beckman    63 y.o.  male  [id number]  Surgeon:Anival Alvarez MD  Date: July 18, 2024    Assessment:  Patient Active Problem List   Diagnosis    Encounter for screening colonoscopy    Non-recurrent bilateral inguinal hernia without obstruction or gangrene    Prostate enlargement    PONV (postoperative nausea and vomiting)    Smoking     Plan:  Jere Beckman is scheduled for robotic bilateral inguinal hernias with mesh.    HPI    Historical Information   Past Medical History:   Diagnosis Date    Anxiety     COVID-19 11/21/2020    ED (erectile dysfunction)     Inguinal hernia, left     Motion sickness     PONV (postoperative nausea and vomiting)     Wears glasses      Past Surgical History:   Procedure Laterality Date    WISDOM TOOTH EXTRACTION       Social History   Social History     Substance and Sexual Activity   Alcohol Use Yes    Alcohol/week: 1.0 standard drink of alcohol    Types: 1 Glasses of wine per week    Comment: social     Social History     Substance and Sexual Activity   Drug Use Never     Social History     Tobacco Use   Smoking Status Some Days    Types: Cigars   Smokeless Tobacco Never     Family History   Problem Relation Age of Onset    Ovarian cancer Mother     Lung cancer Mother     Thyroid cancer Mother         Meds/Allergies   No Known Allergies    Current Facility-Administered Medications:     ceFAZolin (ANCEF) IVPB (premix in dextrose) 2,000 mg 50 mL, 2,000 mg, Intravenous, Once, Anival Alvarez MD    lactated ringers infusion, 125 mL/hr, Intravenous, Continuous, Marshal Brito DO, Last Rate: 125 mL/hr at 07/18/24 0632, 125 mL/hr at 07/18/24 0632    lactated ringers infusion, 125 mL/hr, Intravenous, Continuous, Anival Alvarez MD, New Bag at 07/18/24 0805    sodium chloride 0.9 % infusion, 125 mL/hr, Intravenous, Continuous, Haim Wolf DO, New Bag at 07/18/24 0735    sodium chloride 0.9 % irrigation solution, , , PRN, Anival Alvarez MD, 500 mL at 07/18/24  0905    Facility-Administered Medications Ordered in Other Encounters:     ceFAZolin (ANCEF) IVPB (premix in dextrose), , Intravenous, PRN, Mamie Esqueda CRNA, 2,000 mg at 07/18/24 0745    dexamethasone (PF) (DECADRON) injection, , Intravenous, PRN, Mamie Esqueda CRNA, 10 mg at 07/18/24 0742    fentaNYL injection, , Intravenous, PRN, Mamie Esqueda CRNA, 100 mcg at 07/18/24 0730    HYDROmorphone (DILAUDID) injection, , Intravenous, PRN, Mamie Esqueda CRNA, 0.5 mg at 07/18/24 0815    lidocaine (PF) (XYLOCAINE-MPF) 1 % injection, , Intravenous, PRN, Mamie Esqueda CRNA, 5 mL at 07/18/24 0730    midazolam (VERSED) injection, , Intravenous, PRN, Mamie Esqueda CRNA, 2 mg at 07/18/24 0730    ondansetron (ZOFRAN) injection, , Intravenous, PRN, Mamie Esqueda CRNA, 4 mg at 07/18/24 0739    phenylephrine (YESSI-SYNEPHRINE) 50 mg (STANDARD CONCENTRATION) in sodium chloride 0.9% 250 mL, , Intravenous, Continuous PRN, Mamie Esqueda CRNA, Stopped at 07/18/24 0803    phenylephrine bolus from bag, , Intravenous, PRN, Mamie Esqueda CRNA, 100 mcg at 07/18/24 0843    propofol (DIPRIVAN) 200 MG/20ML bolus injection, , Intravenous, PRN, Mamie Esqueda CRNA, 200 mg at 07/18/24 0731    ROCuronium (ZEMURON) injection, , Intravenous, PRN, Mamie Esqueda CRNA, 10 mg at 07/18/24 0912    Succinylcholine Chloride 100 mg/5 mL syringe, , Intravenous, PRN, Mamie Esqueda CRNA, 100 mg at 07/18/24 0732    Review of Systems    Vitals:    07/18/24 0621   BP: 157/74   Pulse: 73   Resp: 16   Temp: 97.8 °F (36.6 °C)   SpO2: 97%     Physical Exam  GEN: NAD, A+OX3   HEENT: Normocephalic, atraumatic,   NECK: Supple, trachea midline,   CARDIAC: regular rate & rhythm, S1 & S2 normal.   LUNGS: Clear to auscultation, No Wheeze, Rales, or Rhonchi  ABDOMEN: Bilateral inguinal hernia  EXTREMITIES: No evidence of cyanosis, clubbing or edema. Pulses +2 B/L LE  NEURO: CN II-XII intact grossly, No sensory or motor deficits    Lab Results: I have personally  "reviewed pertinent lab results.    Imaging:   EKG, Pathology, and Other Studies:   No results found for: \"GLUCOSE\", \"CALCIUM\", \"NA\", \"K\", \"CO2\", \"CL\", \"BUN\", \"CREATININE\"  No results found for: \"WBC\", \"HGB\", \"HCT\", \"MCV\", \"PLT\"  No results found for: \"ALT\", \"AST\", \"GGT\", \"ALKPHOS\", \"BILITOT\"          "

## 2024-07-18 NOTE — DISCHARGE INSTR - AVS FIRST PAGE
[facility]’s General Surgery [facility]     Post-Operative Care Instructions     Dr. Anival Alvarez MD, [facility]     [phone]          1. General: You will feel pulling sensations around the wound or funny aches and pains around the incisions. This is normal. Even minor surgery is a change in your body and this is your body’s way of reacting to it. If you have had abdominal surgery, it may help to support the incision with a small pillow or blanket for comfort when moving or coughing.     2. Wound care:  Okay to shower.  The glue will fall off over the next week or 2.   Use ice for at least the 1st 48 hours.  Do not use for longer than 20 minutes at a time. Use 3 times per day.     3. Water: You may shower over the wounds. Do not bathe or use a pool or hot tub until cleared by the physician.   If you were discharged with a drain, make sure drain site is covered with plastic wrap before showering.      4. Activity: You may go up and down stairs, walk as much as you are comfortable, but walk at least 3 times each day. If you have had abdominal surgery, do not lift anything heavier than 20 pounds for at least 4 weeks.      5. Diet: You may resume a regular diet. If you had a same-day surgery or overnight stay surgery, you may wish to eat lightly for a few days: soups, crackers, and sandwiches. You may resume a regular diet when ready.      6. Medications: Resume all of your previous medications, unless told otherwise by the doctor. Avoid aspirin products for 2-3 days after the date of surgery. You may, at that time, began to take them again. Use Tylenol and Ibuprofen for pain control.  You may alternate these medications every 3 hours.  For example: you may take Tylenol at noon, Ibuprofen at 3:00 p.m., and Tylenol again at 6:00 p.m., etc. You should use ice to assist with pain control as above.  You do not need to take narcotic pain medication unless you are having significant pain.   If you were prescribed a  narcotic pain medication containing Tylenol, such as Percocet or Norco, do not use supplemental Tylenol.      7. Driving: You will need someone to drive you home on the day of surgery or discharge. Do not drive or make any important decisions while on narcotic pain medication or 24 hours and after anesthesia or sedation for surgery. Generally, you may drive when your off all narcotic pain medications and you are comfortable.      8. Upset Stomach: You may take Maalox, Tums, or similar items for an upset stomach. If your narcotic pain medication causes an upset stomach, do not take it on an empty stomach. Try taking it with at least some crackers or toast.      9. Constipation: Patients often experience constipation after surgery. You may take over-the-counter medication for this, such as Metamucil, Senokot, Dulcolax, milk of magnesia, etc. You may take a suppository unless you have had anorectal surgery such as a procedure on your hemorrhoids. If you experience significant nausea or vomiting after abdominal surgery, call the office before trying any of these medications.     10. Call the office: If you are experiencing any of the following: fevers above 101.5°, significant nausea or vomiting, if the wound develops drainage and/or there is excessive redness around the wound, or if you have significant diarrhea or other worsening symptoms.     11. Pain: You may be given a prescription for pain medication.  This will be sent to your pharmacy prior to discharge.     12. Sexual Activity: You may resume sexual activity when you feel ready and comfortable and your incision is sealed and healed without apparent infection risk.     13. Urination: If you have not urinated in 6 hours, go directly to the ER for evaluation for urinary retention.      14. Follow-up in 2 weeks.          **READ ONLY IF YOU HAVE BEEN DISCHARGED WITH A URINARY CATHETER**    H[place] Insertion for Post-Op Urinary Retention        - A prescription for  Flomax will be sent to your pharmacy.  This should be taken daily while the urinary catheter remains in place.    You will not be given a prescription for Flomax if your prostate has been removed.  If you are already taking Flomax, continue the medication as prescribed.     - We will send a message to the urology group who will contact you within the next 48 hours with further instructions and to schedule an appointment for voiding trial and catheter removal.  The urinary catheter will remain in place for approximately 1 week.  If you are not contacted within the next 48 hours please call our office to assist with scheduling your follow-up.     - If you have your own urologist, you should contact your physician the day after discharge for instructions and to schedule a voiding trial and catheter removal.

## 2024-07-18 NOTE — ANESTHESIA POSTPROCEDURE EVALUATION
Post-Op Assessment Note    CV Status:  Stable    Pain management: adequate       Mental Status:  Sleepy and arousable   Hydration Status:  Stable   PONV Controlled:  None   Airway Patency:  Patent  Two or more mitigation strategies used for obstructive sleep apnea   Post Op Vitals Reviewed: Yes    No anethesia notable event occurred.    Staff: MIGUEL               /63 (07/18/24 0947)    Temp 97.7 °F (36.5 °C) (07/18/24 0947)    Pulse 76 (07/18/24 0947)   Resp 16 (07/18/24 0947)    SpO2 98 % (07/18/24 0947)

## 2024-07-18 NOTE — OP NOTE
OPERATIVE REPORT  PATIENT NAME: Jere Beckman    :  1960  MRN: [id number]  Pt Location: [facility] OR ROOM 08    SURGERY DATE: 2024    Surgeons and Role:     * Anival Alvarez MD - Primary     * Angeles Ralph PA-C - Assisting     * Say Bailey MD - Assisting    Preop Diagnosis:  Non-recurrent bilateral inguinal hernia without obstruction or gangrene [K40.20]    Post-Op Diagnosis Codes:     * Non-recurrent bilateral inguinal hernia without obstruction or gangrene [K40.20]    Procedure(s):  Bilateral - REPAIR HERNIA INGUINAL  W/ ROBOTICS    Specimen(s):  * No specimens in log *    Estimated Blood Loss:   Minimal    Drains:  Urethral Catheter Latex 16 Fr. (Active)   Number of days: 0       Anesthesia Type:   General    Operative Indications:  Non-recurrent bilateral inguinal hernia without obstruction or gangrene [K40.20]      Operative Findings:  Bilateral direct inguinal hernias      Complications:   None    Procedure and Technique:  The patient was seen again in the Holding Room. The risks, benefits, complications, treatment options, and expected outcomes were discussed with the patient. The possibilities of reaction to medication, pulmonary aspiration, perforation of viscus, bleeding, postoperative short or long term nerve entrapment causing pain,recurrent infection, the need for additional procedures, and development of a complication requiring transfusion or further operation were discussed with the patient and/or family. There was concurrence with the proposed plan, and informed consent was obtained. The site of surgery was properly noted/marked. The patient was taken to the Operating Room, identified as Jere Beckman and the procedure verified as hernia repair. A Time Out was held and the above information confirmed.    The patient was prepped and draped in a sterile fashion.  A timeout was again performed.  Local anesthesia was used in the incision. An periumbilical incision was made.   Dissection carried out to the fascia which was grasped with Kocher's and elevated. The fascia was incised an 8 mm trocar was placed in under direct visualization. The abdomen was inflated and the camera was placed in.. Two8 mm trocars were placed lateral to rectus muscle approximately at the level of the umbilicus.  At this point the patient was placed into Trendelenburg position and the robot was docked and the instruments were placed in.  The patient was noted to have bilateral inguinal hernia .   The peritoneum was incised from the medial umbilical fold out laterally past the internal ring on the left.  Next the direct space was mobilized by exposing Parker's ligament all the way along its length to the pubic tubercle.  There was a direct hernia defect was seen this was dissected and reduced.  The rest of the sac was carefully mobilized off the cord structures with care to avoid injury to the gonadal vessels or spermatic cord. The remainder of the inferior flap was created. At this point  Bard 3D max mesh was selected and placed into the preperitoneal space. The mesh was deployed and placed in the appropriate position.  The edge of the peritoneum was well below the edge of the mesh.  The mesh secured with a 2-0 Vicryl suture at Parker's.  The peritoneum was then sutured closed with the V lock suture.      The peritoneum was incised from the medial umbilical fold out laterally past the internal ring on the right. Next the direct space was mobilized by exposing Parker's ligament all the way along its length to the pubic tubercle.  There was a direct hernia defect was seen this was dissected and reduced.  The rest of the sac was carefully mobilized off the cord structures with care to avoid injury to the gonadal vessels or spermatic cord. The remainder of the inferior flap was created. At this point  Bard 3D max mesh was selected and placed into the preperitoneal space. The mesh was deployed and placed in the  appropriate position.  The edge of the peritoneum was well below the edge of the mesh.  The mesh was secured with 2-0 Vicryl suture at Parker's.  The peritoneum was then sutured closed with the V lock suture.     Now the repair was complete the robot was undocked. The umbilical trocor site was closed with an  0-vicryl using a suture Passer  The wound was closed in multiple layers using 3-0 Vicryl sutures and the skin closed using a 4-0 Monocryl subcuticular stitch. The wound was dressed.  The patient was anatomically correct at the end of the procedure.  The patient tolerated the procedure in good condition.    Instrument, sponge, and needle counts were correct prior to closure and at the conclusion of the case.    The PA was essential for assistance with abdominal access and docking the robot as well as retraction and suturing.      This text is generated with voice recognition software. There may be translation, syntax,  or grammatical errors. If you have any questions, please contact the dictating provider.      I was present for the entire procedure.    Patient Disposition:  PACU         SIGNATURE: Anival Alvarez MD  DATE: July 18, 2024  TIME: 9:13 AM

## 2024-08-06 ENCOUNTER — OFFICE VISIT (OUTPATIENT)
Dept: SURGERY | Facility: CLINIC | Age: 64
End: 2024-08-06

## 2024-08-06 VITALS
OXYGEN SATURATION: 96 % | DIASTOLIC BLOOD PRESSURE: 64 MMHG | TEMPERATURE: 96.9 F | HEART RATE: 65 BPM | BODY MASS INDEX: 21.14 KG/M2 | WEIGHT: 151 LBS | SYSTOLIC BLOOD PRESSURE: 120 MMHG | RESPIRATION RATE: 16 BRPM | HEIGHT: 71 IN

## 2024-08-06 DIAGNOSIS — Z09 POSTOP CHECK: Primary | ICD-10-CM

## 2024-08-06 PROCEDURE — 99024 POSTOP FOLLOW-UP VISIT: CPT | Performed by: PHYSICIAN ASSISTANT

## 2024-08-06 NOTE — PROGRESS NOTES
Assessment/Plan:   Jere Beckman is a 63 y.o.male who comes in today for postoperative check after robotic bilateral inguinal hernia repair with Dr. Alvarez on 7/18/24.     Patient is pleased with surgical results.     Pathology: None sent    Postoperative restrictions reviewed, including specific lifting and exercise restrictions. All questions answered.       ___________________________________________________  HPI:  Jere Beckman is a 63 y.o.male who comes in today for postoperative check after recent robotic bilateral inguinal hernia repair with Dr. Alvarez on 7/18/24.     Currently doing well without problems, no fever or chills,no nausea and no vomiting. Denies chest pain and troubles breathing. Reports no issues other then some bilateral testicular swelling. This is not worsening and is really only slightly tender.    ROS:  General ROS: negative for - chills, fatigue, fever or night sweats, weight loss  Respiratory ROS: no cough, shortness of breath, or wheezing  Cardiovascular ROS: no chest pain or dyspnea on exertion  Genito-Urinary ROS: no dysuria, trouble voiding, or hematuria  Musculoskeletal ROS: negative for - gait disturbance, joint pain or muscle pain  Neurological ROS: no TIA or stroke symptoms    GI ROS: see HPI  Skin ROS: no new rashes or lesions   Lymphatic ROS: no new adenopathy noted by pt.   GYN ROS: see HPI, no new GYN history or bleeding noted  Psy ROS: no new mental or behavioral disturbances     Patient Active Problem List   Diagnosis    Encounter for screening colonoscopy    Non-recurrent bilateral inguinal hernia without obstruction or gangrene    Prostate enlargement    PONV (postoperative nausea and vomiting)    Smoking         Allergies:   Patient has no known allergies.      Current Outpatient Medications:     Multiple Vitamin (multivitamin) tablet, Take 1 tablet by mouth daily, Disp: , Rfl:     patient supplied medication, Take 1 each by mouth daily Urinozinc/ Prostate vitamin.  (Patient not taking: Reported on 8/6/2024), Disp: , Rfl:     Past Medical History:   Diagnosis Date    Anxiety     COVID-19 11/21/2020    ED (erectile dysfunction)     Inguinal hernia, left     Motion sickness     PONV (postoperative nausea and vomiting)     Wears glasses        Past Surgical History:   Procedure Laterality Date    HERNIA REPAIR  7/18/2024    NE LAPAROSCOPY SURG RPR INITIAL INGUINAL HERNIA Bilateral 07/18/2024    Procedure: REPAIR HERNIA INGUINAL  W/ ROBOTICS;  Surgeon: Anival Alvarez MD;  Location: [facility];  Service: General    WISDOM TOOTH EXTRACTION         Family History   Problem Relation Age of Onset    Ovarian cancer Mother     Lung cancer Mother     Thyroid cancer Mother     Cancer Mother         Thyroid, ovarian and lung cancer        reports that he has been smoking cigars. He has never used smokeless tobacco. He reports current alcohol use of about 1.0 standard drink of alcohol per week. He reports that he does not use drugs.    Vitals:    08/06/24 0832   BP: 120/64   Pulse: 65   Resp: 16   Temp: (!) 96.9 °F (36.1 °C)   SpO2: 96%        PHYSICAL EXAM  General: normal, cooperative, no distress  Abdominal: soft, nondistended, or nontender  Incision: clean, dry, and intact and healing well    Desire Herbert PA-C    Date: 8/6/2024 Time: 8:52 AM

## 2024-08-12 ENCOUNTER — TELEPHONE (OUTPATIENT)
Dept: UROLOGY | Facility: CLINIC | Age: 64
End: 2024-08-12

## 2024-08-12 NOTE — TELEPHONE ENCOUNTER
Message left for patient in regards to appointment on 10/11/24 at 2:40 needing to be canceled due to a change in schedule.  Appointment has been rescheduled to 10/23/24 at 2:40 with Alejandro at [facility]. Asked patient to call back to confirm change.  [employer] message sent to patient as well.

## 2024-10-23 ENCOUNTER — OFFICE VISIT (OUTPATIENT)
Dept: UROLOGY | Facility: CLINIC | Age: 64
End: 2024-10-23
Payer: COMMERCIAL

## 2024-10-23 VITALS
DIASTOLIC BLOOD PRESSURE: 80 MMHG | SYSTOLIC BLOOD PRESSURE: 130 MMHG | BODY MASS INDEX: 21.53 KG/M2 | HEIGHT: 71 IN | WEIGHT: 153.8 LBS | OXYGEN SATURATION: 98 % | HEART RATE: 69 BPM

## 2024-10-23 DIAGNOSIS — N40.0 PROSTATE ENLARGEMENT: Primary | ICD-10-CM

## 2024-10-23 PROCEDURE — 99213 OFFICE O/P EST LOW 20 MIN: CPT

## 2024-10-23 NOTE — PROGRESS NOTES
Office Visit- Urology  Jere Beckman 1960 MRN: [id number]      Assessment/Discussion/Plan    63 y.o. male managed by     BPH  - mild lower urinary tract symptoms  -Not on any pharmacotherapy but feels the symptoms do not warrant at this point in time.  He utilizes over-the-counter supplements    2.  Prostate cancer screening  -Due for PSA but patient defers PSA given fear of blood draw  -DEEPTI by physician in April 2024 without palpable nodule  -DEEPTI in April 2025    3.  Left testicular pain  -Resolved after hernia repair    Chief Complaint:   Jere is a 63 y.o. male presenting to the office for a follow up visit regarding BPH        Subjective    Patient is a 63-year-old male with a history of left testicular pain who presents to the office today for follow-up he was last in the office for initial consultation with Dr. Devlin in April 2024.  At that point in time patient had prostate emanation that was within normal limits as well as examination of scrotum and testicles with no significant finding beyond known bilateral inguinal hernia.  Patient has been using over-the-counter supplement for prostate symptoms and has found this to be beneficial for control of his urinary tract symptoms.  He does not feel that pharmacotherapy is warranted at this point in time no dysuria, gross hematuria, flank pain.  In the interim since patient was last seen he did have hernia repair and since then his left testicular pain has resolved        AUA SYMPTOM SCORE      Flowsheet Row Most Recent Value   AUA SYMPTOM SCORE    How often have you had a sensation of not emptying your bladder completely after you finished urinating? 1 (P)     How often have you had to urinate again less than two hours after you finished urinating? 1 (P)     How often have you found you stopped and started again several times when you urinate? 0 (P)     How often have you found it difficult to postpone urination? 1 (P)     How often have you had a weak  urinary stream? 2 (P)     How often have you had to push or strain to begin urination? 0 (P)     How many times did you most typically get up to urinate from the time you went to bed at night until the time you got up in the morning? 0 (P)     Quality of Life: If you were to spend the rest of your life with your urinary condition just the way it is now, how would you feel about that? 2 (P)     AUA SYMPTOM SCORE 5 (P)              ROS:   Review of Systems   Constitutional: Negative.  Negative for chills, fatigue and fever.   HENT: Negative.     Respiratory:  Negative for shortness of breath.    Cardiovascular:  Negative for chest pain.   Gastrointestinal: Negative.  Negative for abdominal pain.   Endocrine: Negative.    Musculoskeletal: Negative.    Skin: Negative.    Neurological: Negative.  Negative for dizziness and light-headedness.   Hematological: Negative.    Psychiatric/Behavioral: Negative.           Past Medical History  Past Medical History:   Diagnosis Date    Anxiety     COVID-19 11/21/2020    ED (erectile dysfunction)     Inguinal hernia, left     Motion sickness     PONV (postoperative nausea and vomiting)     Wears glasses        Past Surgical History  Past Surgical History:   Procedure Laterality Date    HERNIA REPAIR  7/18/2024    [facility] LAPAROSCOPY SURG RPR INITIAL INGUINAL HERNIA Bilateral 07/18/2024    Procedure: REPAIR HERNIA INGUINAL  W/ ROBOTICS;  Surgeon: Anival Alvarez MD;  Location: [facility];  Service: General    WISDOM TOOTH EXTRACTION         Past Family History  Family History   Problem Relation Age of Onset    Ovarian cancer Mother     Lung cancer Mother     Thyroid cancer Mother     Cancer Mother         Thyroid, ovarian and lung cancer       Past Social history  Social History     Socioeconomic History    Marital status: /Civil Union     Spouse name: Not on file    Number of children: Not on file    Years of education: Not on file    Highest education level: Not on file  "  Occupational History    Not on file   Tobacco Use    Smoking status: Some Days     Types: Cigars    Smokeless tobacco: Never   Vaping Use    Vaping status: Never Used   Substance and Sexual Activity    Alcohol use: Yes     Alcohol/week: 1.0 standard drink of alcohol     Types: 1 Glasses of wine per week     Comment: social    Drug use: Never    Sexual activity: Not on file   Other Topics Concern    Not on file   Social History Narrative    Not on file     Social Determinants of Health     Financial Resource Strain: Not on file   Food Insecurity: Not on file   Transportation Needs: Not on file   Physical Activity: Not on file   Stress: Not on file   Social Connections: Not on file   Intimate Partner Violence: Not on file   Housing Stability: Not on file       Current Medications  Current Outpatient Medications   Medication Sig Dispense Refill    Multiple Vitamin (multivitamin) tablet Take 1 tablet by mouth daily      patient supplied medication Take 1 each by mouth daily Urinozinc/ Prostate vitamin.       No current facility-administered medications for this visit.       Allergies  No Known Allergies    OBJECTIVE    Vitals   Vitals:    10/23/24 1450   BP: 130/80   BP Location: Left arm   Patient Position: Sitting   Cuff Size: Adult   Pulse: 69   SpO2: 98%   Weight: 69.8 kg (153 lb 12.8 oz)   Height: 5' 11\" (1.803 m)       PVR:    Physical Exam  Constitutional:       General: He is not in acute distress.     Appearance: Normal appearance. He is normal weight. He is not ill-appearing or toxic-appearing.   HENT:      Head: Normocephalic and atraumatic.   Eyes:      Conjunctiva/sclera: Conjunctivae normal.   Cardiovascular:      Rate and Rhythm: Normal rate.   Pulmonary:      Effort: Pulmonary effort is normal. No respiratory distress.   Skin:     General: Skin is warm and dry.   Neurological:      General: No focal deficit present.      Mental Status: He is alert and oriented to person, place, and time.      Cranial " "Nerves: No cranial nerve deficit.   Psychiatric:         Mood and Affect: Mood normal.         Behavior: Behavior normal.         Thought Content: Thought content normal.          Labs:   LASTLAB(PROTEIN UA,TP,[id number],PROT,PROTEIN UA,PROTEINUA,PROTUR,LABPROTURI,PROTEIN,URPROTEIN)@   No results found for: \"PSA\"  No results found for: \"CREATININE\"   No results found for: \"HGBA1C\"  No results found for: \"GLUCOSE\", \"CALCIUM\", \"NA\", \"K\", \"CO2\", \"CL\", \"BUN\", \"CREATININE\"    I have personally reviewed all pertinent lab results and reviewed with patient    Imaging       Alejandro Dickerson PA-C  Date: 10/23/2024 Time: 2:56 PM  [facility]    This note was written using fluency dictation software. Please excuse any resulting minor grammatical errors.      "

## 2025-04-29 NOTE — PROGRESS NOTES
4/30/2025      Chief Complaint   Patient presents with   • Follow-up     6 month f/u       Assessment and Plan    64 y.o. male managed by Ap team       1. Screening for prostate cancer  Assessment & Plan:    No PSA on file   Defers PSA given fear of blood draw in the past   Encouraged patient to obtain level   Patient aware of [facility] recommendations regarding prostate cancer screening  Denies family history of prostate cancer  DEEPTI- defferred   PSA level placed for patient to obtain  Orders:  -     PSA, Total Screen; Future  2. Benign prostatic hyperplasia with urinary frequency  Assessment & Plan:  BPH  Mild LUTS   Deferred pharmacotherapy   Previously PVR low   Continues with prostate vitamin reports good results with this  Plan to continue monitoring symptoms follow-up in 1 year        History of Present Illness  Jere Beckman is a 64 y.o. male here for evaluation of prostate cancer screening and BPH symptoms.  Patient was previously seen in consultation for testicular discomfort.  At that time he was scheduled for hernia surgery.  Since hernia surgery he denies any testicular concerns.    He additionally has some urinary frequency that is intermittent.  He deferred previous pharmacotherapy.  He continues to take a prostate vitamin supplement and notices good results.  Nocturia x 0.  Feels that he adequately empties.  No concern for infection.    Denies family history of prostate cancer    No PSA levels on file.  Patient reports an extreme fear of having blood draw.  He reports even for the hernia surgery he is approxidid not pass out.  He has no blood work on his chart.  He is aware of the risks was not checking his PSA level        Review of Systems   Constitutional:  Negative for chills and fever.   HENT:  Negative for ear pain and sore throat.    Eyes:  Negative for pain and visual disturbance.   Respiratory:  Negative for cough and shortness of breath.    Cardiovascular:  Negative for chest pain and palpitations.  "  Gastrointestinal:  Negative for abdominal pain and vomiting.   Genitourinary:  Positive for frequency (intermittent). Negative for decreased urine volume, difficulty urinating, dysuria, flank pain, hematuria and urgency.   Musculoskeletal:  Negative for arthralgias and back pain.   Skin:  Negative for color change and rash.   Neurological:  Negative for seizures and syncope.   All other systems reviewed and are negative.               Vitals  Vitals:    04/30/25 1532   BP: 120/84   BP Location: Left arm   Patient Position: Sitting   Cuff Size: Adult   Pulse: 67   SpO2: 96%   Weight: 68.9 kg (152 lb)   Height: 5' 11\" (1.803 m)       Physical Exam  Vitals reviewed.   Constitutional:       Appearance: Normal appearance.   HENT:      Head: Normocephalic and atraumatic.   Eyes:      Conjunctiva/sclera: Conjunctivae normal.   Pulmonary:      Effort: Pulmonary effort is normal.   Abdominal:      General: Abdomen is flat. There is no distension.      Palpations: Abdomen is soft.      Tenderness: There is no abdominal tenderness.   Genitourinary:     Comments: Deferred     Musculoskeletal:         General: Normal range of motion.      Cervical back: Normal range of motion.   Skin:     General: Skin is warm and dry.   Neurological:      General: No focal deficit present.      Mental Status: He is alert and oriented to person, place, and time.   Psychiatric:         Mood and Affect: Mood normal.         Behavior: Behavior normal.         Thought Content: Thought content normal.         Judgment: Judgment normal.           Past History  Past Medical History:   Diagnosis Date   • Anxiety    • COVID-19 11/21/2020   • ED (erectile dysfunction)    • Inguinal hernia, left    • Motion sickness    • PONV (postoperative nausea and vomiting)    • Wears glasses      Social History     Socioeconomic History   • Marital status: /Civil Union     Spouse name: None   • Number of children: None   • Years of education: None   • Highest " "education level: None   Occupational History   • None   Tobacco Use   • Smoking status: Some Days     Types: Cigars   • Smokeless tobacco: Never   Vaping Use   • Vaping status: Never Used   Substance and Sexual Activity   • Alcohol use: Yes     Alcohol/week: 1.0 standard drink of alcohol     Types: 1 Glasses of wine per week     Comment: social   • Drug use: Never   • Sexual activity: None   Other Topics Concern   • None   Social History Narrative   • None     Social Drivers of Health     Financial Resource Strain: Not on file   Food Insecurity: Not on file   Transportation Needs: Not on file   Physical Activity: Not on file   Stress: Not on file   Social Connections: Not on file   Intimate Partner Violence: Not on file   Housing Stability: Not on file     Social History     Tobacco Use   Smoking Status Some Days   • Types: Cigars   Smokeless Tobacco Never     Family History   Problem Relation Age of Onset   • Ovarian cancer Mother    • Lung cancer Mother    • Thyroid cancer Mother    • Cancer Mother         Thyroid, ovarian and lung cancer       The following portions of the patient's history were reviewed and updated as appropriate: allergies, current medications, past medical history, past social history, past surgical history and problem list.    Results  No results found for this or any previous visit (from the past hour).]  No results found for: \"PSA\"  No results found for: \"GLUCOSE\", \"CALCIUM\", \"NA\", \"K\", \"CO2\", \"CL\", \"BUN\", \"CREATININE\"  No results found for: \"WBC\", \"HGB\", \"HCT\", \"MCV\", \"PLT\"    "

## 2025-04-30 ENCOUNTER — OFFICE VISIT (OUTPATIENT)
Dept: UROLOGY | Facility: CLINIC | Age: 65
End: 2025-04-30
Payer: COMMERCIAL

## 2025-04-30 VITALS
DIASTOLIC BLOOD PRESSURE: 84 MMHG | HEART RATE: 67 BPM | SYSTOLIC BLOOD PRESSURE: 120 MMHG | WEIGHT: 152 LBS | HEIGHT: 71 IN | BODY MASS INDEX: 21.28 KG/M2 | OXYGEN SATURATION: 96 %

## 2025-04-30 DIAGNOSIS — R35.0 BENIGN PROSTATIC HYPERPLASIA WITH URINARY FREQUENCY: ICD-10-CM

## 2025-04-30 DIAGNOSIS — Z12.5 SCREENING FOR PROSTATE CANCER: Primary | ICD-10-CM

## 2025-04-30 DIAGNOSIS — N40.1 BENIGN PROSTATIC HYPERPLASIA WITH URINARY FREQUENCY: ICD-10-CM

## 2025-04-30 PROCEDURE — 99213 OFFICE O/P EST LOW 20 MIN: CPT

## 2025-04-30 NOTE — ASSESSMENT & PLAN NOTE
No PSA on file   Defers PSA given fear of blood draw in the past   Encouraged patient to obtain level   Patient aware of [facility] recommendations regarding prostate cancer screening  Denies family history of prostate cancer  DEEPTI- defferred   PSA level placed for patient to obtain

## 2025-04-30 NOTE — ASSESSMENT & PLAN NOTE
BPH  Mild LUTS   Deferred pharmacotherapy   Previously PVR low   Continues with prostate vitamin reports good results with this  Plan to continue monitoring symptoms follow-up in 1 year

## (undated) DEVICE — TIP COVER ACCESSORY

## (undated) DEVICE — BLADELESS OBTURATOR: Brand: WECK VISTA

## (undated) DEVICE — GLOVE SRG BIOGEL 6.5

## (undated) DEVICE — SCD SEQUENTIAL COMPRESSION COMFORT SLEEVE MEDIUM KNEE LENGTH: Brand: KENDALL SCD

## (undated) DEVICE — GLOVE INDICATOR PI UNDERGLOVE SZ 6.5 BLUE

## (undated) DEVICE — MONOPOLAR CURVED SCISSORS: Brand: ENDOWRIST

## (undated) DEVICE — DRAPE EQUIPMENT RF WAND

## (undated) DEVICE — SUT VICRYL 0 UR-6 27 IN J603H

## (undated) DEVICE — MEGA SUTURECUT ND: Brand: ENDOWRIST

## (undated) DEVICE — [HIGH FLOW INSUFFLATOR,  DO NOT USE IF PACKAGE IS DAMAGED,  KEEP DRY,  KEEP AWAY FROM SUNLIGHT,  PROTECT FROM HEAT AND RADIOACTIVE SOURCES.]: Brand: PNEUMOSURE

## (undated) DEVICE — ELECTRO LUBE IS A SINGLE PATIENT USE DEVICE THAT IS INTENDED TO BE USED ON ELECTROSURGICAL ELECTRODES TO REDUCE STICKING.: Brand: KEY SURGICAL ELECTRO LUBE

## (undated) DEVICE — SUT MONOCRYL 4-0 PS-2 27 IN Y426H

## (undated) DEVICE — PROGRASP FORCEPS: Brand: ENDOWRIST

## (undated) DEVICE — CANNULA SEAL

## (undated) DEVICE — SUT VICRYL 2-0 SH 27 IN UNDYED J417H

## (undated) DEVICE — DISPOSABLE OR TOWEL: Brand: CARDINAL HEALTH

## (undated) DEVICE — COLUMN DRAPE

## (undated) DEVICE — PENCIL ELECTROSURG E-Z CLEAN -0035H

## (undated) DEVICE — NEEDLE HYPO 23G X 1-1/2 IN

## (undated) DEVICE — SUT VLOC 90 3-0 V-20 9IN VLOCM0644

## (undated) DEVICE — ARM DRAPE

## (undated) DEVICE — CHLORAPREP HI-LITE 26ML ORANGE

## (undated) DEVICE — MAYO STAND COVER: Brand: CONVERTORS

## (undated) DEVICE — PMI DISPOSABLE PUNCTURE CLOSURE DEVICE / SUTURE GRASPER: Brand: PMI

## (undated) DEVICE — INTENDED FOR TISSUE SEPARATION, AND OTHER PROCEDURES THAT REQUIRE A SHARP SURGICAL BLADE TO PUNCTURE OR CUT.: Brand: BARD-PARKER SAFETY BLADES SIZE 11, STERILE

## (undated) DEVICE — ASTOUND STANDARD SURGICAL GOWN, XL: Brand: CONVERTORS

## (undated) DEVICE — TRAY FOLEY 16FR URIMETER SURESTEP

## (undated) DEVICE — GLOVE INDICATOR PI UNDERGLOVE SZ 8 BLUE

## (undated) DEVICE — DRAPE SHEET X-LG

## (undated) DEVICE — ALLENTOWN LAP CHOLE APP PACK: Brand: CARDINAL HEALTH

## (undated) DEVICE — TIBURON LAPAROSCOPIC ABDOMINAL DRAPE: Brand: CONVERTORS

## (undated) DEVICE — GLOVE SRG BIOGEL ECLIPSE 7.5

## (undated) DEVICE — EXOFIN PRECISION PEN HIGH VISCOSITY TOPICAL SKIN ADHESIVE: Brand: EXOFIN PRECISION PEN, 1G